# Patient Record
Sex: MALE | Race: WHITE | Employment: FULL TIME | URBAN - METROPOLITAN AREA
[De-identification: names, ages, dates, MRNs, and addresses within clinical notes are randomized per-mention and may not be internally consistent; named-entity substitution may affect disease eponyms.]

---

## 2019-11-05 RX ORDER — MULTIPLE VITAMINS W/ MINERALS TAB 9MG-400MCG
1 TAB ORAL DAILY
COMMUNITY

## 2019-11-08 ENCOUNTER — HOSPITAL ENCOUNTER (OUTPATIENT)
Facility: CLINIC | Age: 49
Discharge: HOME | End: 2019-11-08
Attending: INTERNAL MEDICINE | Admitting: INTERNAL MEDICINE
Payer: COMMERCIAL

## 2019-11-08 VITALS
HEIGHT: 69 IN | OXYGEN SATURATION: 95 % | WEIGHT: 210 LBS | RESPIRATION RATE: 18 BRPM | DIASTOLIC BLOOD PRESSURE: 86 MMHG | HEART RATE: 60 BPM | BODY MASS INDEX: 31.1 KG/M2 | SYSTOLIC BLOOD PRESSURE: 125 MMHG

## 2019-11-08 LAB — COLONOSCOPY: NORMAL

## 2019-11-08 PROCEDURE — 25000128 H RX IP 250 OP 636: Performed by: INTERNAL MEDICINE

## 2019-11-08 PROCEDURE — 45378 DIAGNOSTIC COLONOSCOPY: CPT | Performed by: INTERNAL MEDICINE

## 2019-11-08 PROCEDURE — G0121 COLON CA SCRN NOT HI RSK IND: HCPCS | Performed by: INTERNAL MEDICINE

## 2019-11-08 PROCEDURE — G0500 MOD SEDAT ENDO SERVICE >5YRS: HCPCS | Performed by: INTERNAL MEDICINE

## 2019-11-08 RX ORDER — ONDANSETRON 2 MG/ML
4 INJECTION INTRAMUSCULAR; INTRAVENOUS EVERY 6 HOURS PRN
Status: DISCONTINUED | OUTPATIENT
Start: 2019-11-08 | End: 2019-11-08 | Stop reason: HOSPADM

## 2019-11-08 RX ORDER — NALOXONE HYDROCHLORIDE 0.4 MG/ML
.1-.4 INJECTION, SOLUTION INTRAMUSCULAR; INTRAVENOUS; SUBCUTANEOUS
Status: DISCONTINUED | OUTPATIENT
Start: 2019-11-08 | End: 2019-11-08 | Stop reason: HOSPADM

## 2019-11-08 RX ORDER — FENTANYL CITRATE 50 UG/ML
INJECTION, SOLUTION INTRAMUSCULAR; INTRAVENOUS PRN
Status: DISCONTINUED | OUTPATIENT
Start: 2019-11-08 | End: 2019-11-08 | Stop reason: HOSPADM

## 2019-11-08 RX ORDER — ONDANSETRON 2 MG/ML
4 INJECTION INTRAMUSCULAR; INTRAVENOUS
Status: DISCONTINUED | OUTPATIENT
Start: 2019-11-08 | End: 2019-11-08 | Stop reason: HOSPADM

## 2019-11-08 RX ORDER — FLUMAZENIL 0.1 MG/ML
0.2 INJECTION, SOLUTION INTRAVENOUS
Status: DISCONTINUED | OUTPATIENT
Start: 2019-11-08 | End: 2019-11-08 | Stop reason: HOSPADM

## 2019-11-08 RX ORDER — ONDANSETRON 4 MG/1
4 TABLET, ORALLY DISINTEGRATING ORAL EVERY 6 HOURS PRN
Status: DISCONTINUED | OUTPATIENT
Start: 2019-11-08 | End: 2019-11-08 | Stop reason: HOSPADM

## 2019-11-08 RX ORDER — LIDOCAINE 40 MG/G
CREAM TOPICAL
Status: DISCONTINUED | OUTPATIENT
Start: 2019-11-08 | End: 2019-11-08 | Stop reason: HOSPADM

## 2019-11-08 ASSESSMENT — MIFFLIN-ST. JEOR: SCORE: 1797.93

## 2019-11-08 NOTE — H&P
Pre-Endoscopy History and Physical     Amilcar Vega MRN# [id number]   YOB: 1970 Age: 49 year old     Date of Procedure: 11/8/2019  [facility] care provider: [facility]  Type of Endoscopy: Colonoscopy with possible biopsy, possible polypectomy  Reason for Procedure: screen  Type of Anesthesia Anticipated: Conscious Sedation    HPI:    Amilcar is a 49 year old male who will be undergoing the above procedure.      A history and physical has been performed. The patient's medications and allergies have been reviewed. The risks and benefits of the procedure and the sedation options and risks were discussed with the patient.  All questions were answered and informed consent was obtained.      He denies a personal or family history of anesthesia complications or bleeding disorders.     There is no problem list on file for this patient.       History reviewed. No pertinent past medical history.     History reviewed. No pertinent surgical history.    Social History     Tobacco Use     Smoking status: Never Smoker     Smokeless tobacco: Former User     Types: Chew   Substance Use Topics     Alcohol use: Yes     Comment: one beer a day       Family History   Problem Relation Age of Onset     No Known Problems Mother      No Known Problems Father        Prior to Admission medications    Medication Sig Start Date End Date Taking? Authorizing Provider   multivitamin w/minerals (THERA-VIT-M) tablet Take 1 tablet by mouth daily   Yes Reported, Patient       No Known Allergies     REVIEW OF SYSTEMS:   5 point ROS negative except as noted above in HPI, including Gen., Resp., CV, GI &  system review.    PHYSICAL EXAM:   There were no vitals taken for this visit. There is no height or weight on file to calculate BMI.   GENERAL APPEARANCE: alert, and oriented  MENTAL STATUS: alert  AIRWAY EXAM: Mallampatti Class I (visualization of the soft palate, fauces, uvula, anterior and posterior pillars)  RESP: lungs  clear to auscultation - no rales, rhonchi or wheezes  CV: regular rates and rhythm  DIAGNOSTICS:    Not indicated    IMPRESSION   ASA Class 1 - Healthy patient, no medical problems    PLAN:   Plan for Colonoscopy with possible biopsy, possible polypectomy. We discussed the risks, benefits and alternatives and the patient wished to proceed.    The above has been forwarded to the consulting provider.      Signed Electronically by: Ernie Lindsey MD  November 8, 2019

## 2025-01-16 ENCOUNTER — HOSPITAL ENCOUNTER (EMERGENCY)
Facility: CLINIC | Age: 55
Discharge: HOME | End: 2025-01-16
Attending: PHYSICIAN ASSISTANT
Payer: COMMERCIAL

## 2025-01-16 VITALS
SYSTOLIC BLOOD PRESSURE: 179 MMHG | HEART RATE: 75 BPM | RESPIRATION RATE: 18 BRPM | OXYGEN SATURATION: 100 % | DIASTOLIC BLOOD PRESSURE: 119 MMHG | TEMPERATURE: 97.8 F

## 2025-01-16 DIAGNOSIS — I10 ASYMPTOMATIC HYPERTENSION: ICD-10-CM

## 2025-01-16 DIAGNOSIS — R03.0 ELEVATED BLOOD PRESSURE READING: ICD-10-CM

## 2025-01-16 LAB
ALBUMIN SERPL BCG-MCNC: 4.9 G/DL (ref 3.5–5.2)
ALP SERPL-CCNC: 59 U/L (ref 40–150)
ALT SERPL W P-5'-P-CCNC: 24 U/L (ref 0–70)
ANION GAP SERPL CALCULATED.3IONS-SCNC: 8 MMOL/L (ref 7–15)
AST SERPL W P-5'-P-CCNC: 16 U/L (ref 0–45)
ATRIAL RATE - MUSE: 69 BPM
BASOPHILS # BLD AUTO: 0 10E3/UL (ref 0–0.2)
BASOPHILS NFR BLD AUTO: 1 %
BILIRUB SERPL-MCNC: 0.9 MG/DL
BUN SERPL-MCNC: 16.1 MG/DL (ref 6–20)
CALCIUM SERPL-MCNC: 9.1 MG/DL (ref 8.8–10.4)
CHLORIDE SERPL-SCNC: 104 MMOL/L (ref 98–107)
CREAT SERPL-MCNC: 0.96 MG/DL (ref 0.67–1.17)
DIASTOLIC BLOOD PRESSURE - MUSE: NORMAL MMHG
EGFRCR SERPLBLD CKD-EPI 2021: >90 ML/MIN/1.73M2
EOSINOPHIL # BLD AUTO: 0.1 10E3/UL (ref 0–0.7)
EOSINOPHIL NFR BLD AUTO: 2 %
ERYTHROCYTE [DISTWIDTH] IN BLOOD BY AUTOMATED COUNT: 12 % (ref 10–15)
GLUCOSE SERPL-MCNC: 102 MG/DL (ref 70–99)
HCO3 SERPL-SCNC: 27 MMOL/L (ref 22–29)
HCT VFR BLD AUTO: 44.1 % (ref 40–53)
HGB BLD-MCNC: 15.9 G/DL (ref 13.3–17.7)
HOLD SPECIMEN: NORMAL
HOLD SPECIMEN: NORMAL
IMM GRANULOCYTES # BLD: 0 10E3/UL
IMM GRANULOCYTES NFR BLD: 0 %
INTERPRETATION ECG - MUSE: NORMAL
LYMPHOCYTES # BLD AUTO: 1.6 10E3/UL (ref 0.8–5.3)
LYMPHOCYTES NFR BLD AUTO: 33 %
MCH RBC QN AUTO: 32.8 PG (ref 26.5–33)
MCHC RBC AUTO-ENTMCNC: 36.1 G/DL (ref 31.5–36.5)
MCV RBC AUTO: 91 FL (ref 78–100)
MONOCYTES # BLD AUTO: 0.4 10E3/UL (ref 0–1.3)
MONOCYTES NFR BLD AUTO: 8 %
NEUTROPHILS # BLD AUTO: 2.8 10E3/UL (ref 1.6–8.3)
NEUTROPHILS NFR BLD AUTO: 56 %
NRBC # BLD AUTO: 0 10E3/UL
NRBC BLD AUTO-RTO: 0 /100
P AXIS - MUSE: 31 DEGREES
PLATELET # BLD AUTO: 222 10E3/UL (ref 150–450)
POTASSIUM SERPL-SCNC: 3.6 MMOL/L (ref 3.4–5.3)
PR INTERVAL - MUSE: 180 MS
PROT SERPL-MCNC: 7.1 G/DL (ref 6.4–8.3)
QRS DURATION - MUSE: 118 MS
QT - MUSE: 416 MS
QTC - MUSE: 445 MS
R AXIS - MUSE: -14 DEGREES
RBC # BLD AUTO: 4.85 10E6/UL (ref 4.4–5.9)
SODIUM SERPL-SCNC: 139 MMOL/L (ref 135–145)
SYSTOLIC BLOOD PRESSURE - MUSE: NORMAL MMHG
T AXIS - MUSE: -5 DEGREES
TROPONIN T SERPL HS-MCNC: 8 NG/L
TSH SERPL DL<=0.005 MIU/L-ACNC: 2.39 UIU/ML (ref 0.3–4.2)
VENTRICULAR RATE- MUSE: 69 BPM
WBC # BLD AUTO: 5 10E3/UL (ref 4–11)

## 2025-01-16 PROCEDURE — 99284 EMERGENCY DEPT VISIT MOD MDM: CPT

## 2025-01-16 PROCEDURE — 84484 ASSAY OF TROPONIN QUANT: CPT | Performed by: PHYSICIAN ASSISTANT

## 2025-01-16 PROCEDURE — 84484 ASSAY OF TROPONIN QUANT: CPT | Performed by: EMERGENCY MEDICINE

## 2025-01-16 PROCEDURE — 36415 COLL VENOUS BLD VENIPUNCTURE: CPT | Performed by: EMERGENCY MEDICINE

## 2025-01-16 PROCEDURE — 93005 ELECTROCARDIOGRAM TRACING: CPT

## 2025-01-16 PROCEDURE — 85048 AUTOMATED LEUKOCYTE COUNT: CPT | Performed by: EMERGENCY MEDICINE

## 2025-01-16 PROCEDURE — 80053 COMPREHEN METABOLIC PANEL: CPT | Performed by: PHYSICIAN ASSISTANT

## 2025-01-16 PROCEDURE — 84443 ASSAY THYROID STIM HORMONE: CPT | Performed by: PHYSICIAN ASSISTANT

## 2025-01-16 PROCEDURE — 85004 AUTOMATED DIFF WBC COUNT: CPT | Performed by: EMERGENCY MEDICINE

## 2025-01-16 PROCEDURE — 85025 COMPLETE CBC W/AUTO DIFF WBC: CPT | Performed by: PHYSICIAN ASSISTANT

## 2025-01-16 PROCEDURE — 85041 AUTOMATED RBC COUNT: CPT | Performed by: EMERGENCY MEDICINE

## 2025-01-16 PROCEDURE — 82374 ASSAY BLOOD CARBON DIOXIDE: CPT | Performed by: EMERGENCY MEDICINE

## 2025-01-16 PROCEDURE — 84155 ASSAY OF PROTEIN SERUM: CPT | Performed by: EMERGENCY MEDICINE

## 2025-01-16 RX ORDER — LISINOPRIL 10 MG/1
10 TABLET ORAL DAILY
Qty: 30 TABLET | Refills: 0 | Status: SHIPPED | OUTPATIENT
Start: 2025-01-16 | End: 2025-02-15

## 2025-01-16 RX ORDER — BENZONATATE 100 MG/1
100 CAPSULE ORAL 3 TIMES DAILY PRN
Qty: 15 CAPSULE | Refills: 0 | Status: SHIPPED | OUTPATIENT
Start: 2025-01-16

## 2025-01-16 ASSESSMENT — COLUMBIA-SUICIDE SEVERITY RATING SCALE - C-SSRS
6. HAVE YOU EVER DONE ANYTHING, STARTED TO DO ANYTHING, OR PREPARED TO DO ANYTHING TO END YOUR LIFE?: NO
2. HAVE YOU ACTUALLY HAD ANY THOUGHTS OF KILLING YOURSELF IN THE PAST MONTH?: NO
1. IN THE PAST MONTH, HAVE YOU WISHED YOU WERE DEAD OR WISHED YOU COULD GO TO SLEEP AND NOT WAKE UP?: NO

## 2025-01-16 ASSESSMENT — ACTIVITIES OF DAILY LIVING (ADL): ADLS_ACUITY_SCORE: 41

## 2025-01-16 NOTE — ED PROVIDER NOTES
Emergency Department Note      History of Present Illness     Chief Complaint   Hypertension      HPI   Amilcar Vega is a 54 year old male presenting with hypertension. The patient was seen today at urgent care for a cough and was directed here due to his high blood pressure. He denies any headache, vision changes, chest pain, back, abdominal or leg edema. The patient reports he took mucinex and sudafed for symptom relief but has not taken it in the last 4-5 days. The patient denies any diet change or excessive weight gain. He reports he is otherwise healthy and not taking any prescribed medication. The patient notes he has a family history of high blood pressure and thyroidism. No drugs or smoking. He states the occasional alcoholic beverage but not daily. The patient also states he gets EKG every 6 months due to his job as an  and nothing was unusual.  He notes his cough was actually improving but had a little bit of lingering tickle prompting his presentation.  He does not feel short of breath or dizzy.  He works as an  and BPs have otherwise been normal in the past though it has been a little while since it has been checked.    Independent Historian   None    Review of External Notes   I reviewed [facility] visit from Dr. Oneal on 6/21/2023 where the patient was seen for a sore throat.  At that time he was normotensive.  Note no prior history of hypertension.    Past Medical History     Medical History and Problem List   The patient does not have any pertinent past medical history.     Medications   The patient is not currently taking any prescribed medications.     Surgical History   Colonoscopy  [place] tooth extraction  Orthopedic surgery     Physical Exam     Patient Vitals for the past 24 hrs:   BP Temp Temp src Pulse Resp SpO2   01/16/25 1627 (!) 179/119 -- -- 75 -- 100 %   01/16/25 1624 -- 97.8  F (36.6  C) Temporal -- -- --   01/16/25 1557 (!) 194/122 -- -- 74 -- 100 %    01/16/25 1155 (!) 214/123 -- -- 80 18 100 %     Physical Exam  General: Awake, alert, non-toxic.  Head:  Scalp is NC/AT  Eyes:  Conjunctiva normal, PERRL  ENT:  The external nose and ears are normal.     Oropharynx clear, uvula midline.  Neck:  Normal range of motion without rigidity.  CV:  Regular rate and rhythm    No pathologic murmur, rubs, or gallops.  Resp:  Breath sounds are clear bilaterally    Non-labored, no retractions or accessory muscle use  Abdomen: Abdomen is soft, no distension, no tenderness, no masses.  No bruits heard.  MS:  No lower extremity edema/swelling.   Skin:  Warm and dry, No rash or lesions noted.  Neuro:  Alert and oriented.  GCS 15 Moves all extremities normal.  No facial asymmetry. Gait normal.  Psych:  Awake. Alert. Normal affect. Appropriate interactions.      Diagnostics     Lab Results   Labs Ordered and Resulted from Time of ED Arrival to Time of ED Departure   COMPREHENSIVE METABOLIC PANEL - Abnormal       Result Value    Sodium 139      Potassium 3.6      Carbon Dioxide (CO2) 27      Anion Gap 8      Urea Nitrogen 16.1      Creatinine 0.96      GFR Estimate >90      Calcium 9.1      Chloride 104      Glucose 102 (*)     Alkaline Phosphatase 59      AST 16      ALT 24      Protein Total 7.1      Albumin 4.9      Bilirubin Total 0.9     TROPONIN T, HIGH SENSITIVITY - Normal    Troponin T, High Sensitivity 8     TSH WITH FREE T4 REFLEX - Normal    TSH 2.39     CBC WITH PLATELETS AND DIFFERENTIAL    WBC Count 5.0      RBC Count 4.85      Hemoglobin 15.9      Hematocrit 44.1      MCV 91      MCH 32.8      MCHC 36.1      RDW 12.0      Platelet Count 222      % Neutrophils 56      % Lymphocytes 33      % Monocytes 8      % Eosinophils 2      % Basophils 1      % Immature Granulocytes 0      NRBCs per 100 WBC 0      Absolute Neutrophils 2.8      Absolute Lymphocytes 1.6      Absolute Monocytes 0.4      Absolute Eosinophils 0.1      Absolute Basophils 0.0      Absolute Immature  Granulocytes 0.0      Absolute NRBCs 0.0             ECG results from 01/16/25   EKG 12 lead     Value    Systolic Blood Pressure     Diastolic Blood Pressure     Ventricular Rate 69    Atrial Rate 69    VA Interval 180    QRS Duration 118        QTc 445    P Axis 31    R AXIS -14    T Axis -5    Interpretation ECG      Sinus rhythm  Non-specific intra-ventricular conduction delay  Minimal voltage criteria for LVH, may be normal variant ( [place] product )  Borderline ECG  No previous ECGs available  Confirmed by - EMERGENCY ROOM, PHYSICIAN ([id number]),  ELINA GALLEGO ([id number]) on 1/16/2025 12:25:58 PM         Independent Interpretation   None    ED Course      Medications Administered   Medications - No data to display    Procedures   Procedures     Discussion of Management   None    ED Course   ED Course as of 01/16/25 1642   Thu Jan 16, 2025   1600 I obtained history and examined the patient as noted above    1636 I rechecked the patient and explained findings.        Additional Documentation  None    Medical Decision Making / Diagnosis     CMS Diagnoses: None    MIPS       None    [facility]   Amilcar HUGO Vega is a 54 year old male with no pertinent past medical history who presents with significantly elevated blood pressures from urgent care.  He was seen there for resolving cough in his throat and a mild tickle though he notes those are much improved.  He was sent here due to severely elevated blood pressure.  He has never had this or been told this before.  He is completely asymptomatic and there is no clinical evidence of hypertensive emergency.  Likewise nothing at this time to suggest secondary hypertension, TSH and creatinine and electrolytes are unremarkable, no headache tachycardia or other symptoms of pheochromocytoma, no murmur, no abdominal bruits heard.  No evidence of drug-induced hypertension or withdrawal.  The patient's blood pressure did remain very significantly elevated throughout his visit  here though it is somewhat improved.  His EKG was notable for possible changes of mild LVH though could be normal variant had nothing prior to compare it to no evidence of ischemia or dysrhythmia and his high-sensitivity troponin is normal.  We discussed options and at this time have elected to start a low-dose blood pressure medication for now with lisinopril.  I urged him to follow-up closely with his primary care provider for recheck of his blood pressure and he will call tomorrow to schedule this also understands that dose may need to be adjusted additional meds and AV needed and he will need follow-up blood work.  He expressed an understanding of this.  He will return if he develops any concerning symptoms and we also discussed lifestyle changes such as limiting alcohol, sodium, exercise, weight etc.    Disposition   The patient was discharged.     Diagnosis     ICD-10-CM    1. Asymptomatic hypertension  I10       2. Elevated blood pressure reading  R03.0            Discharge Medications   New Prescriptions    LISINOPRIL (ZESTRIL) 10 MG TABLET    Take 1 tablet (10 mg) by mouth daily.         Scribe Disclosure:  I, Lexy Johnson, am serving as a scribe at 4:09 PM on 1/16/2025 to document services personally performed by Ayaan Poon PA-C based on my observations and the provider's statements to me.        Ayaan Poon PA-C  01/16/25 [id number]

## 2025-01-16 NOTE — ED TRIAGE NOTES
Arrives from the clinic for a cough and was told his blood pressure was high.     States was told to present here but has NO symptoms.     Denies blood pressure medications.

## (undated) DEVICE — KIT ENDO TURNOVER/PROCEDURE W/CLEAN A SCOPE LINERS 103888

## (undated) RX ORDER — FENTANYL CITRATE 50 UG/ML
INJECTION, SOLUTION INTRAMUSCULAR; INTRAVENOUS
Status: DISPENSED
Start: 2019-11-08